# Patient Record
Sex: MALE | Race: WHITE | NOT HISPANIC OR LATINO | Employment: FULL TIME | URBAN - METROPOLITAN AREA
[De-identification: names, ages, dates, MRNs, and addresses within clinical notes are randomized per-mention and may not be internally consistent; named-entity substitution may affect disease eponyms.]

---

## 2017-08-10 ENCOUNTER — ALLSCRIPTS OFFICE VISIT (OUTPATIENT)
Dept: OTHER | Facility: OTHER | Age: 32
End: 2017-08-10

## 2017-09-26 ENCOUNTER — ALLSCRIPTS OFFICE VISIT (OUTPATIENT)
Dept: OTHER | Facility: OTHER | Age: 32
End: 2017-09-26

## 2017-09-26 DIAGNOSIS — Z30.2 ENCOUNTER FOR STERILIZATION: ICD-10-CM

## 2017-09-26 PROCEDURE — 88302 TISSUE EXAM BY PATHOLOGIST: CPT | Performed by: UROLOGY

## 2017-09-27 ENCOUNTER — LAB REQUISITION (OUTPATIENT)
Dept: LAB | Facility: HOSPITAL | Age: 32
End: 2017-09-27
Payer: COMMERCIAL

## 2017-09-27 DIAGNOSIS — Z30.2 ENCOUNTER FOR STERILIZATION: ICD-10-CM

## 2017-10-11 ENCOUNTER — ALLSCRIPTS OFFICE VISIT (OUTPATIENT)
Dept: OTHER | Facility: OTHER | Age: 32
End: 2017-10-11

## 2017-10-13 NOTE — PROGRESS NOTES
Plan  Encounter for vasectomy    · (1) SEMEN ANALYSIS POST VAS ; Status:Active; Requested IIU:[id number];    Perform:[facility]; YBR:[id number]; Ordered;For:Encounter for vasectomy; Ordered By:Priti Harman; Health Maintenance    · (1) SEMEN ANALYSIS POST VAS ; Status:Active; Requested OBS:[id number];    Perform:[facility]; QIK:[id number]; Ordered;For:Health Maintenance; Ordered By:Priti Harman;    Discussion/Summary  Discussion Summary:   70-year-old male managed by Dr Jose Marsh  Status post vasectomy 7/26/17  patient is doing well with no complaints  He was provided verbal and written instructions regarding semen analysis testing at 6 and 8 weeks after his vasectomy took place  Advised to attempt to ejaculate 15 times prior to his semen analysis  We reviewed the importance of using protection to prevent un-intended pregnancy until sterility is confirmed with his 2 semen analyses  He will call to review the results  Otherwise, will followup on an as-needed basis and will begin prostate cancer screening at age 54  Patient understands and agrees to treatment plan  All questions and concerns addressed and answered  History of Present Illness  HPI: Lynsey Alvarez is a 70-year-old male here for follow-up evaluation status post vasectomy 8/10/17 with Dr Jose Marsh  The patient denies any trouble since his procedure and he has been healing well  Tissue exam reveals complete cross-section of bilateral vas deferens  He denies any family history of prostate cancer  Review of Systems  Complete-Male Urology:   Constitutional: No fever or chills, feels well, no tiredness, no recent weight gain or weight loss  Respiratory: No complaints of shortness of breath, no wheezing, no cough, no SOB on exertion, no orthopnea or PND  Cardiovascular: No complaints of slow heart rate, no fast heart rate, no chest pain, no palpitations, no leg claudication, no lower extremity     Gastrointestinal: No complaints of abdominal pain, no constipation, no nausea or vomiting, no diarrhea or bloody stools  Musculoskeletal: No complaints of arthralgia, no myalgias, no joint swelling or stiffness, no limb pain or swelling  Integumentary: No complaints of skin rash or skin lesions, no itching, no skin wound, no dry skin  Hematologic/Lymphatic: No complaints of swollen glands, no swollen glands in the neck, does not bleed easily, no easy bruising  Neurological: No compliants of headache, no confusion, no convulsions, no numbness or tingling, no dizziness or fainting, no limb weakness, no difficulty walking  ROS Reviewed:   ROS reviewed  Active Problems  1  Encounter for vasectomy (V25 2) (Z30 2)   2  Screening and evaluation for vasectomy (V72 83) (Z30 09)    Past Medical History  1  History of No significant past medical history  Active Problems And Past Medical History Reviewed: The active problems and past medical history were reviewed and updated today  Surgical History  Surgical History Reviewed: The surgical history was reviewed and updated today  Family History  Father    1  Family history of Benign essential HTN   2  Family history of Diabetes mellitus, labile   3  Family history of cardiac disorder (V17 49) (Z82 49)   4  Family history of kidney stones (V18 69) (Z84 1)  Family History Reviewed: The family history was reviewed and updated today  Social History   ·    · Never a smoker   · Social alcohol use (Z78 9)  Social History Reviewed: The social history was reviewed and updated today  The social history was reviewed and is unchanged  Current Meds   1  Hydrocodone-Acetaminophen 5-325 MG Oral Tablet; TAKE 1 TO 2 TABLETS EVERY 4 TO   6 HOURS AS NEEDED FOR PAIN;   Therapy: 87Gnq8383 to (Evaluate:11Oct2017); Last Rx:55Gpe4051 Ordered   2  LORazepam 2 MG Oral Tablet; one tablet one hour prior to procedure; Therapy: 95NPE9060 to (Evaluate:11Aug2017);  Last Rx:10Aug2017 Ordered  Medication List Reviewed: The medication list was reviewed and updated today  Allergies  1  Nuts   2  Peanuts    Vitals  Vital Signs    Recorded: 66UUA3074 03:12PM   Heart Rate 151   Systolic 813   Diastolic 80     Physical Exam    Constitutional   General appearance: No acute distress, well appearing and well nourished  Eyes   Conjunctiva and lids: No erythema, swelling or discharge  Ears, Nose, Mouth, and Throat   Hearing: Normal     Pulmonary   Respiratory effort: No increased work of breathing or signs of respiratory distress  Abdomen   Abdomen: Non-tender, no masses  Genitourinary Well-healed scrotal incision  Musculoskeletal   Gait and station: Normal     Psychiatric   Mood and affect: Normal        Results/Data  (1) TISSUE EXAM [id number] 03:03PM [facility] [employer]     Test Name Result Flag Reference   LAB AP CASE REPORT (Report)     Surgical Pathology Report             Case: [id number]                   Authorizing Provider: Macie Paula MD     Collected:      09/26/2017           Pathologist:      Ephraim Del Toro MD       Received:      09/28/2017 0749        Specimens:  A) - Vas Deferens, Right                                        B) - Vas Deferens, Left   LAB AP FINAL DIAGNOSIS      A-B  Right and left vas deferens (sterilization):  - Complete cross-sections of bilateral vas deferens    Interpretation performed at , [address]     Electronically signed by Ephraim Del Toro MD on 10/2/2017 at 3:03 PM   LAB AP SURGICAL ADDITIONAL INFORMATION (Report)     All controls performed with the immunohistochemical stains reported above   reacted appropriately  These tests were developed and their performance   characteristics determined by [facility] or   [facility]  They may not be cleared or approved by the [place]  Food and Drug Administration   The FDA has determined that such clearance   or approval is not necessary  These tests are used for clinical purposes  They should not be regarded as investigational or for research  This   laboratory has been approved by [place] [id number], designated as a high-complexity   laboratory and is qualified to perform these tests  LAB AP GROSS DESCRIPTION (Report)     A  The specimen is received in formalin, labeled with the patient's name   and medical record number, and is designated right vas deferens  The   specimen consists of a tan-gray tubular tissue fragment measuring 0 9 cm   in length and up to 0 2 cm in diameter  The ends are painted with blue   ink  Entire submitted  One cassette  B  The specimen is received in formalin, labeled with the patient's name   and medical record number, and is designated Left vas deferens  The   specimen consists of a tan-gray tubular tissue fragment measuring 0 8 cm   in length and up to 0 2 cm in diameter  The ends are inked blue  Entire   submitted  One cassette  Note: The estimated total formalin fixation time based upon information   provided by the submitting clinician and the standard processing schedule   is over 72 hours        AEK     Signatures   Electronically signed by : Kirstin Amanda, ; Oct 11 2017  3:16PM EST                       (Author)    Electronically signed by : Raman Steiner MD; Oct 11 2017  4:16PM EST

## 2017-10-27 NOTE — PROCEDURES
Assessment  1  Encounter for vasectomy (V25 2) (Z30 2)    Plan  Encounter for vasectomy    · Hydrocodone-Acetaminophen 5-325 MG Oral Tablet; TAKE 1 TO 2 TABLETS EVERY  4 TO 6 HOURS AS NEEDED FOR PAIN   Rx By: Rosalinda Gonzales; Dispense: 15 Days ; #:15 Tablet; Refill: 0;For: Encounter for vasectomy; MITCH = N; Print Rx   · (1) TISSUE EXAM; Status:Active - Retrospective By Protocol Authorization; Requested  XAU:[id number];    Perform:[facility]; CNR:[id number]; Last Updated By:Gautam Kwan; 9/26/2017 11:15:04 AM;Ordered;For:Encounter for vasectomy; Ordered By:Ashlyn Roe;  Clinical Information: : Z30 2  B : [facility]  B Date/Time: : 45COU0595  B [facility]  A : R Vas Deferens  A Date/Time: : 80WCQ4102  A : Vas Deferens  Impression: : Z30 2   · Follow-up visit in 3 weeks Evaluation and Treatment  Follow-up  Status: Hold For -  Scheduling  Requested for: 12HMU6484   Ordered; For: Encounter for vasectomy; Ordered By: Rosalinda Gonzales Performed:  Due: 98MQA1704    Discussion/Summary  Discussion Summary:   The patient is status post vasectomy  I recommended rest, ice, and scrotal support  I've asked that he return in the next 2-3 weeks for a post vasectomy check  I have prescribed Vicodin to take as needed  The patient understands that he is not yet considered sterile  I have recommended semen analyses at 6 and 8 weeks post procedure  In the interim I have recommended contraception to avoid an undesired pregnancy  Chief Complaint  Chief Complaint Free Text Note Form: Patient presents for vasectomy      History of Present Illness  HPI: Jaki Colin is a 75-year-old male who returns to the office today to undergo vasectomy  Risk and benefits of the procedure were discussed and reviewed  Informed consent was previously obtained  The patient had taken Ativan as prescribed  Active Problems  1  Encounter for vasectomy (V25 2) (Z30 2)   2   Screening and evaluation for vasectomy (V72 83) (Z30 09)    Past Medical History  1  History of No significant past medical history    Family History  Father    1  Family history of Benign essential HTN   2  Family history of Diabetes mellitus, labile   3  Family history of cardiac disorder (V17 49) (Z82 49)   4  Family history of kidney stones (V18 69) (Z84 1)    Social History   ·    · Never a smoker   · Social alcohol use (Z78 9)    Current Meds   1  LORazepam 2 MG Oral Tablet; one tablet one hour prior to procedure; Therapy: 66AJD2592 to (Evaluate:11Aug2017); Last Rx:10Aug2017 Ordered    Allergies  1  Nuts   2  Peanuts    Procedure  Vasectomy Procedure Note:  and benefits of vasectomy were previously discussed  Informed consent was obtained at his prior office visit  The patient had taken Ativan as prescribed  patient was placed in the supine position  His genitalia was prepped and draped in a sterile fashion  The left vas deferens was grasped and presented to the midline of the scrotum  2% lidocaine was used to anesthetize the skin, subcutaneous tissue, and left vas deferens  A scalpeless opening was made in the midline of the scrotum  The left vas deferens was grasped and presented into the surgical field  A #15 blade was used to make a longitudinal incision in the sheath of the vas deferens  The vas itself was then dissected free from the surrounding tissue  Clamps were placed proximally and distally and a 1 cm segment of the vas deferens was excised  Silk ties were applied and exposed ends were fulgurated  Hemostasis was excellent  The vas was returned to its natural anatomic position and the same procedure was then repeated on the patient's right side  A single stitch was placed through the skin and dartos to reapproximate the defect  Bacitracin ointment was applied        Future Appointments    Date/Time Provider Specialty Site   10/11/2017 03:00 PM [facility]     Signatures   Electronically signed by : Chelsy Estrada MD; Sep 26 2017 12:00PM EST                       (Author)

## 2017-11-07 DIAGNOSIS — Z00.00 ENCOUNTER FOR GENERAL ADULT MEDICAL EXAMINATION WITHOUT ABNORMAL FINDINGS: ICD-10-CM

## 2017-11-07 DIAGNOSIS — Z30.2 ENCOUNTER FOR STERILIZATION: ICD-10-CM

## 2017-12-18 ENCOUNTER — GENERIC CONVERSION - ENCOUNTER (OUTPATIENT)
Dept: OTHER | Facility: OTHER | Age: 32
End: 2017-12-18

## 2018-01-12 VITALS — SYSTOLIC BLOOD PRESSURE: 122 MMHG | DIASTOLIC BLOOD PRESSURE: 80 MMHG | HEART RATE: 111 BPM

## 2018-01-13 VITALS
BODY MASS INDEX: 33.23 KG/M2 | DIASTOLIC BLOOD PRESSURE: 80 MMHG | SYSTOLIC BLOOD PRESSURE: 118 MMHG | WEIGHT: 237.38 LBS | HEART RATE: 60 BPM | HEIGHT: 71 IN

## 2018-01-23 NOTE — MISCELLANEOUS
Message  Patient aware of negative semen results per Dr Marilyn Goodpasture    1  Encounter for vasectomy (V25 2) (Z30 2)   2  Screening and evaluation for vasectomy (V72 83) (Z30 09)    Current Meds   1  Hydrocodone-Acetaminophen 5-325 MG Oral Tablet; TAKE 1 TO 2 TABLETS EVERY 4   TO 6 HOURS AS NEEDED FOR PAIN;   Therapy: 69Stx3088 to (Evaluate:11Oct2017); Last Rx:[id number] Ordered   2  LORazepam 2 MG Oral Tablet; one tablet one hour prior to procedure; Therapy: 23GOR0617 to (Evaluate:11Aug2017); Last Rx:10Aug2017 Ordered    Allergies    1  Nuts   2  Peanuts    Signatures   Electronically signed by :  Deneen Torres, ; Dec 18 2017  2:50PM EST                       (Author)

## 2018-12-28 ENCOUNTER — HOSPITAL ENCOUNTER (OUTPATIENT)
Dept: ULTRASOUND IMAGING | Facility: HOSPITAL | Age: 33
Discharge: HOME | End: 2018-12-28
Attending: PEDIATRICS
Payer: COMMERCIAL

## 2018-12-28 ENCOUNTER — TRANSCRIBE ORDERS (OUTPATIENT)
Dept: ADMINISTRATIVE | Facility: HOSPITAL | Age: 33
End: 2018-12-28

## 2018-12-28 DIAGNOSIS — N50.89 TESTICULAR MASS: ICD-10-CM

## 2018-12-28 DIAGNOSIS — N50.89 TESTICULAR MASS: Primary | ICD-10-CM

## 2018-12-28 PROCEDURE — 76870 US EXAM SCROTUM: CPT
